# Patient Record
Sex: FEMALE | Race: WHITE | Employment: FULL TIME | URBAN - METROPOLITAN AREA
[De-identification: names, ages, dates, MRNs, and addresses within clinical notes are randomized per-mention and may not be internally consistent; named-entity substitution may affect disease eponyms.]

---

## 2017-06-29 ENCOUNTER — HOSPITAL ENCOUNTER (EMERGENCY)
Facility: HOSPITAL | Age: 57
Discharge: HOME | End: 2017-06-29
Attending: EMERGENCY MEDICINE
Payer: COMMERCIAL

## 2017-06-29 ENCOUNTER — APPOINTMENT (OUTPATIENT)
Dept: GENERAL RADIOLOGY | Facility: HOSPITAL | Age: 57
End: 2017-06-29
Attending: EMERGENCY MEDICINE
Payer: COMMERCIAL

## 2017-06-29 VITALS
SYSTOLIC BLOOD PRESSURE: 139 MMHG | OXYGEN SATURATION: 96 % | RESPIRATION RATE: 16 BRPM | HEIGHT: 63 IN | DIASTOLIC BLOOD PRESSURE: 78 MMHG | WEIGHT: 235 LBS | TEMPERATURE: 98 F | HEART RATE: 78 BPM | BODY MASS INDEX: 41.64 KG/M2

## 2017-06-29 DIAGNOSIS — R07.9 CHEST PAIN OF UNCERTAIN ETIOLOGY: Primary | ICD-10-CM

## 2017-06-29 PROCEDURE — 84484 ASSAY OF TROPONIN QUANT: CPT | Performed by: EMERGENCY MEDICINE

## 2017-06-29 PROCEDURE — 99285 EMERGENCY DEPT VISIT HI MDM: CPT

## 2017-06-29 PROCEDURE — 93010 ELECTROCARDIOGRAM REPORT: CPT

## 2017-06-29 PROCEDURE — 36415 COLL VENOUS BLD VENIPUNCTURE: CPT

## 2017-06-29 PROCEDURE — 71020 XR CHEST PA + LAT CHEST (CPT=71020): CPT | Performed by: EMERGENCY MEDICINE

## 2017-06-29 PROCEDURE — 93005 ELECTROCARDIOGRAM TRACING: CPT

## 2017-06-29 PROCEDURE — 85025 COMPLETE CBC W/AUTO DIFF WBC: CPT | Performed by: EMERGENCY MEDICINE

## 2017-06-29 PROCEDURE — 85378 FIBRIN DEGRADE SEMIQUANT: CPT | Performed by: EMERGENCY MEDICINE

## 2017-06-29 PROCEDURE — 80048 BASIC METABOLIC PNL TOTAL CA: CPT | Performed by: EMERGENCY MEDICINE

## 2017-06-29 RX ORDER — GLYBURIDE 2.5 MG/1
2.5 TABLET ORAL
COMMUNITY
End: 2018-02-09

## 2017-06-29 RX ORDER — ASPIRIN 81 MG/1
324 TABLET, CHEWABLE ORAL ONCE
Status: COMPLETED | OUTPATIENT
Start: 2017-06-29 | End: 2017-06-29

## 2017-06-29 NOTE — ED NOTES
Received call from lab, potassium was hemolyzed. Dr. Gosia Fox notified. Did not receive orders to redraw specimen.

## 2017-06-29 NOTE — ED NOTES
Pt aox4. Rn discussed d/c, & follow up with pcp with pt. Pt verbalized understanding of dc instructions. Pt ambulated to ed exit with steady gait.

## 2017-06-29 NOTE — ED INITIAL ASSESSMENT (HPI)
Pt aox4. Pt c/o left sided chest pain that radiates underneath left axilla started this am while working. Pt states pain increases when moving neck. Pt c/o michelle with chest pain. Pt states has had this pain in the past & has been chronic.  Pt states its been

## 2017-06-29 NOTE — ED PROVIDER NOTES
Patient Seen in: [facility] Emergency Department    History   Patient presents with:  Chest Pain Angina (cardiovascular)    Stated Complaint: chest pain    HPI    59-year-old female, history diabetes, here for chest pain.   She has had sharp chest pa present. Cardiovascular: Normal rate and regular rhythm. Pulmonary/Chest: Effort normal and breath sounds normal. No stridor. Abdominal: Soft. There is no tenderness. There is no guarding. Musculoskeletal: Exhibits no edema or tenderness.    Neurol d-dimer negative. Blood lites within normal limits. Potassium not reported. Given normal creatinine and no reports of palpitations will defer redraw.     Chest x-ray reviewed, normal.  MDM     60-year-old female here with sharp chest pain on her left azalea

## 2018-02-08 ENCOUNTER — APPOINTMENT (OUTPATIENT)
Dept: CT IMAGING | Facility: HOSPITAL | Age: 58
DRG: 069 | End: 2018-02-08
Attending: EMERGENCY MEDICINE
Payer: COMMERCIAL

## 2018-02-08 ENCOUNTER — HOSPITAL ENCOUNTER (OUTPATIENT)
Facility: HOSPITAL | Age: 58
Setting detail: OBSERVATION
Discharge: HOME | DRG: 069 | End: 2018-02-09
Attending: EMERGENCY MEDICINE | Admitting: HOSPITALIST
Payer: COMMERCIAL

## 2018-02-08 ENCOUNTER — APPOINTMENT (OUTPATIENT)
Dept: GENERAL RADIOLOGY | Facility: HOSPITAL | Age: 58
DRG: 069 | End: 2018-02-08
Attending: EMERGENCY MEDICINE
Payer: COMMERCIAL

## 2018-02-08 DIAGNOSIS — G45.9 TRANSIENT CEREBRAL ISCHEMIA, UNSPECIFIED TYPE: Primary | ICD-10-CM

## 2018-02-08 PROBLEM — E11.49 TYPE 2 DIABETES MELLITUS WITH NEUROLOGIC COMPLICATION (HCC): Status: ACTIVE | Noted: 2018-02-08

## 2018-02-08 LAB
ALBUMIN SERPL-MCNC: 3.6 G/DL (ref 3.5–4.8)
ALP LIVER SERPL-CCNC: 101 U/L (ref 46–118)
ALT SERPL-CCNC: 21 U/L (ref 14–54)
APTT PPP: 28.9 SECONDS (ref 25–34)
AST SERPL-CCNC: 20 U/L (ref 15–41)
BASOPHILS # BLD AUTO: 0.04 X10(3) UL (ref 0–0.1)
BASOPHILS NFR BLD AUTO: 0.3 %
BILIRUB SERPL-MCNC: 0.4 MG/DL (ref 0.1–2)
BILIRUB UR QL STRIP.AUTO: NEGATIVE
BUN BLD-MCNC: 18 MG/DL (ref 8–20)
CALCIUM BLD-MCNC: 9.2 MG/DL (ref 8.3–10.3)
CHLORIDE: 108 MMOL/L (ref 101–111)
CHOLEST SMN-MCNC: 132 MG/DL (ref ?–200)
CLARITY UR REFRACT.AUTO: CLEAR
CO2: 26 MMOL/L (ref 22–32)
COLOR UR AUTO: COLORLESS
CREAT BLD-MCNC: 0.64 MG/DL (ref 0.55–1.02)
EOSINOPHIL # BLD AUTO: 0.22 X10(3) UL (ref 0–0.3)
EOSINOPHIL NFR BLD AUTO: 1.9 %
ERYTHROCYTE [DISTWIDTH] IN BLOOD BY AUTOMATED COUNT: 12.8 % (ref 11.5–16)
GLUCOSE BLD-MCNC: 122 MG/DL (ref 65–99)
GLUCOSE BLD-MCNC: 132 MG/DL (ref 65–99)
GLUCOSE BLD-MCNC: 42 MG/DL (ref 70–99)
GLUCOSE BLD-MCNC: 52 MG/DL (ref 65–99)
GLUCOSE BLD-MCNC: 57 MG/DL (ref 65–99)
GLUCOSE BLD-MCNC: 67 MG/DL (ref 65–99)
GLUCOSE BLD-MCNC: 69 MG/DL (ref 65–99)
GLUCOSE BLD-MCNC: 71 MG/DL (ref 65–99)
GLUCOSE BLD-MCNC: 89 MG/DL (ref 65–99)
GLUCOSE UR STRIP.AUTO-MCNC: NEGATIVE MG/DL
HCT VFR BLD AUTO: 37.7 % (ref 34–50)
HDLC SERPL-MCNC: 49 MG/DL (ref 45–?)
HDLC SERPL: 2.69 {RATIO} (ref ?–4.44)
HGB BLD-MCNC: 11.9 G/DL (ref 12–16)
IMMATURE GRANULOCYTE COUNT: 0.04 X10(3) UL (ref 0–1)
IMMATURE GRANULOCYTE RATIO %: 0.3 %
INR BLD: 1 (ref 0.89–1.11)
KETONES UR STRIP.AUTO-MCNC: NEGATIVE MG/DL
LDLC SERPL CALC-MCNC: 65 MG/DL (ref ?–130)
LEUKOCYTE ESTERASE UR QL STRIP.AUTO: NEGATIVE
LYMPHOCYTES # BLD AUTO: 4.29 X10(3) UL (ref 0.9–4)
LYMPHOCYTES NFR BLD AUTO: 36.6 %
M PROTEIN MFR SERPL ELPH: 7.3 G/DL (ref 6.1–8.3)
MCH RBC QN AUTO: 27.5 PG (ref 27–33.2)
MCHC RBC AUTO-ENTMCNC: 31.6 G/DL (ref 31–37)
MCV RBC AUTO: 87.1 FL (ref 81–100)
MONOCYTES # BLD AUTO: 0.72 X10(3) UL (ref 0.1–1)
MONOCYTES NFR BLD AUTO: 6.1 %
NEUTROPHIL ABS PRELIM: 6.41 X10 (3) UL (ref 1.3–6.7)
NEUTROPHILS # BLD AUTO: 6.41 X10(3) UL (ref 1.3–6.7)
NEUTROPHILS NFR BLD AUTO: 54.8 %
NITRITE UR QL STRIP.AUTO: NEGATIVE
NONHDLC SERPL-MCNC: 83 MG/DL (ref ?–130)
PH UR STRIP.AUTO: 6 [PH] (ref 4.5–8)
PLATELET # BLD AUTO: 369 10(3)UL (ref 150–450)
POTASSIUM SERPL-SCNC: 4.4 MMOL/L (ref 3.6–5.1)
PROT UR STRIP.AUTO-MCNC: NEGATIVE MG/DL
PSA SERPL DL<=0.01 NG/ML-MCNC: 13.2 SECONDS (ref 12–14.3)
RBC # BLD AUTO: 4.33 X10(6)UL (ref 3.8–5.1)
RBC UR QL AUTO: NEGATIVE
RED CELL DISTRIBUTION WIDTH-SD: 41.1 FL (ref 35.1–46.3)
SODIUM SERPL-SCNC: 140 MMOL/L (ref 136–144)
SP GR UR STRIP.AUTO: <1.005 (ref 1–1.03)
TRIGL SERPL-MCNC: 92 MG/DL (ref ?–150)
TROPONIN: <0.046 NG/ML (ref ?–0.05)
UROBILINOGEN UR STRIP.AUTO-MCNC: <2 MG/DL
VLDLC SERPL CALC-MCNC: 18 MG/DL (ref 5–40)
WBC # BLD AUTO: 11.7 X10(3) UL (ref 4–13)

## 2018-02-08 PROCEDURE — 71045 X-RAY EXAM CHEST 1 VIEW: CPT | Performed by: EMERGENCY MEDICINE

## 2018-02-08 PROCEDURE — 70450 CT HEAD/BRAIN W/O DYE: CPT | Performed by: EMERGENCY MEDICINE

## 2018-02-08 PROCEDURE — 99222 1ST HOSP IP/OBS MODERATE 55: CPT | Performed by: HOSPITALIST

## 2018-02-08 RX ORDER — PHENYLEPHRINE HCL IN 0.9% NACL 50MG/250ML
PLASTIC BAG, INJECTION (ML) INTRAVENOUS CONTINUOUS PRN
Status: DISCONTINUED | OUTPATIENT
Start: 2018-02-08 | End: 2018-02-09

## 2018-02-08 RX ORDER — POLYETHYLENE GLYCOL 3350 17 G/17G
17 POWDER, FOR SOLUTION ORAL DAILY PRN
Status: DISCONTINUED | OUTPATIENT
Start: 2018-02-08 | End: 2018-02-09

## 2018-02-08 RX ORDER — SODIUM CHLORIDE 9 MG/ML
INJECTION, SOLUTION INTRAVENOUS CONTINUOUS
Status: DISCONTINUED | OUTPATIENT
Start: 2018-02-08 | End: 2018-02-09

## 2018-02-08 RX ORDER — ACETAMINOPHEN 325 MG/1
650 TABLET ORAL EVERY 4 HOURS PRN
Status: DISCONTINUED | OUTPATIENT
Start: 2018-02-08 | End: 2018-02-09

## 2018-02-08 RX ORDER — BISACODYL 10 MG
10 SUPPOSITORY, RECTAL RECTAL
Status: DISCONTINUED | OUTPATIENT
Start: 2018-02-08 | End: 2018-02-09

## 2018-02-08 RX ORDER — SENNOSIDES 8.6 MG
17.2 TABLET ORAL NIGHTLY
Status: DISCONTINUED | OUTPATIENT
Start: 2018-02-08 | End: 2018-02-09

## 2018-02-08 RX ORDER — SODIUM PHOSPHATE, DIBASIC AND SODIUM PHOSPHATE, MONOBASIC 7; 19 G/133ML; G/133ML
1 ENEMA RECTAL ONCE AS NEEDED
Status: DISCONTINUED | OUTPATIENT
Start: 2018-02-08 | End: 2018-02-09

## 2018-02-08 RX ORDER — LABETALOL HYDROCHLORIDE 5 MG/ML
10 INJECTION, SOLUTION INTRAVENOUS EVERY 10 MIN PRN
Status: DISCONTINUED | OUTPATIENT
Start: 2018-02-08 | End: 2018-02-09

## 2018-02-08 RX ORDER — ONDANSETRON 2 MG/ML
4 INJECTION INTRAMUSCULAR; INTRAVENOUS EVERY 6 HOURS PRN
Status: DISCONTINUED | OUTPATIENT
Start: 2018-02-08 | End: 2018-02-09

## 2018-02-08 RX ORDER — MORPHINE SULFATE 2 MG/ML
2 INJECTION, SOLUTION INTRAMUSCULAR; INTRAVENOUS EVERY 2 HOUR PRN
Status: DISCONTINUED | OUTPATIENT
Start: 2018-02-08 | End: 2018-02-09

## 2018-02-08 RX ORDER — GABAPENTIN 100 MG/1
CAPSULE ORAL 2 TIMES DAILY
Status: ON HOLD | COMMUNITY
End: 2018-02-09

## 2018-02-08 RX ORDER — FAMOTIDINE 10 MG/ML
20 INJECTION, SOLUTION INTRAVENOUS 2 TIMES DAILY
Status: DISCONTINUED | OUTPATIENT
Start: 2018-02-08 | End: 2018-02-09

## 2018-02-08 RX ORDER — DOCUSATE SODIUM 100 MG/1
100 CAPSULE, LIQUID FILLED ORAL 2 TIMES DAILY
Status: DISCONTINUED | OUTPATIENT
Start: 2018-02-08 | End: 2018-02-09

## 2018-02-08 RX ORDER — ASPIRIN 81 MG/1
324 TABLET, CHEWABLE ORAL DAILY
Status: DISCONTINUED | OUTPATIENT
Start: 2018-02-09 | End: 2018-02-08 | Stop reason: DRUGHIGH

## 2018-02-08 RX ORDER — ASPIRIN 300 MG
300 SUPPOSITORY, RECTAL RECTAL DAILY
Status: DISCONTINUED | OUTPATIENT
Start: 2018-02-09 | End: 2018-02-09

## 2018-02-08 RX ORDER — ACETAMINOPHEN 650 MG/1
650 SUPPOSITORY RECTAL EVERY 4 HOURS PRN
Status: DISCONTINUED | OUTPATIENT
Start: 2018-02-08 | End: 2018-02-09

## 2018-02-08 RX ORDER — GABAPENTIN 100 MG/1
100 CAPSULE ORAL 3 TIMES DAILY
Status: DISCONTINUED | OUTPATIENT
Start: 2018-02-08 | End: 2018-02-09

## 2018-02-08 RX ORDER — ATORVASTATIN CALCIUM 80 MG/1
80 TABLET, FILM COATED ORAL NIGHTLY
Status: DISCONTINUED | OUTPATIENT
Start: 2018-02-08 | End: 2018-02-09

## 2018-02-08 RX ORDER — FAMOTIDINE 20 MG/1
20 TABLET ORAL 2 TIMES DAILY
Status: DISCONTINUED | OUTPATIENT
Start: 2018-02-08 | End: 2018-02-09

## 2018-02-08 RX ORDER — MORPHINE SULFATE 2 MG/ML
1 INJECTION, SOLUTION INTRAMUSCULAR; INTRAVENOUS EVERY 2 HOUR PRN
Status: DISCONTINUED | OUTPATIENT
Start: 2018-02-08 | End: 2018-02-09

## 2018-02-08 RX ORDER — ENOXAPARIN SODIUM 100 MG/ML
0.5 INJECTION SUBCUTANEOUS NIGHTLY
Status: DISCONTINUED | OUTPATIENT
Start: 2018-02-08 | End: 2018-02-09

## 2018-02-08 RX ORDER — ASPIRIN 325 MG
325 TABLET ORAL DAILY
Status: DISCONTINUED | OUTPATIENT
Start: 2018-02-09 | End: 2018-02-09

## 2018-02-08 RX ORDER — ACETAMINOPHEN 325 MG/1
650 TABLET ORAL EVERY 6 HOURS PRN
Status: DISCONTINUED | OUTPATIENT
Start: 2018-02-08 | End: 2018-02-08 | Stop reason: DRUGHIGH

## 2018-02-08 RX ORDER — DEXTROSE MONOHYDRATE 25 G/50ML
INJECTION, SOLUTION INTRAVENOUS
Status: DISCONTINUED
Start: 2018-02-08 | End: 2018-02-08 | Stop reason: WASHOUT

## 2018-02-08 RX ORDER — ASPIRIN 81 MG/1
324 TABLET, CHEWABLE ORAL ONCE
Status: COMPLETED | OUTPATIENT
Start: 2018-02-08 | End: 2018-02-08

## 2018-02-08 RX ORDER — DEXTROSE MONOHYDRATE 25 G/50ML
50 INJECTION, SOLUTION INTRAVENOUS
Status: DISCONTINUED | OUTPATIENT
Start: 2018-02-08 | End: 2018-02-09

## 2018-02-08 RX ORDER — METOCLOPRAMIDE HYDROCHLORIDE 5 MG/ML
10 INJECTION INTRAMUSCULAR; INTRAVENOUS EVERY 8 HOURS PRN
Status: DISCONTINUED | OUTPATIENT
Start: 2018-02-08 | End: 2018-02-09

## 2018-02-08 NOTE — ED INITIAL ASSESSMENT (HPI)
Pt complaining of an episode of feeling \"woozy\", and lightheaded with left forearm and hand numbness then turned to left finger tingling. Episodes lasted for about 15 minutes.  episode happened at around 12:45 pm. Pt work as a  in [employer] and had be

## 2018-02-08 NOTE — H&P
[facility] HOSPITAL[facility]  History and Physical     Ray Shafer Patient Status:  Emergency    1960 MRN [id number]   Location [address] Attending Sabrina Reilly, [address] [facility] Day # 0 PCP Rita Resendiz DO     Chief Complaint: Yong Ellison Tab Chew by mouth daily. Disp:  Rfl:    METFORMIN HCL OR Take by mouth. Disp:  Rfl:        Review of Systems:   A comprehensive 14 point review of systems was completed. Pertinent positives and negatives noted in the HPI.     Physical Exam:    /75 recheck fasting lipid profile in the morning while on statin  -UA and culture pending  2. Morbid obesity BMI 41 weight loss advised  3.   Possible TIA symptoms completely resolved continue neuro checks and monitoring overnight      Quality:  · DVT Prophyla

## 2018-02-08 NOTE — ED PROVIDER NOTES
Patient Seen in: [facility] Emergency Department    History   Patient presents with:  Dizziness (neurologic)    Stated Complaint: episode of light-headedness, arm tingling, blood sugar 150s    HPI    27-year-old female with history of diabetes mellimelda Pulse 76   Temp 97.9 °F (36.6 °C)   Resp 16   Ht 160 cm (5' 3\")   Wt 106.6 kg   SpO2 100%   BMI 41.63 kg/m²         Physical Exam    GENERAL: Patient is awake, alert, well-appearing, in no acute distress.   HEENT: Pupils equal round reactive to light, extr Absolute 4.29 (*)     All other components within normal limits   TROPONIN I - Normal   PROTHROMBIN TIME (PT) - Normal   PTT, ACTIVATED - Normal    Narrative: The aPTT Heparin Therapeutic Range is approximately 65- 104 seconds.  The therapeutic range ha List        Present on Admission           ICD-10-CM Noted POA    Transient cerebral ischemia G45.9 2/8/2018 Unknown    Type 2 diabetes mellitus with neurologic complication [facility]) C98.70 2/8/2018 Unknown

## 2018-02-09 ENCOUNTER — APPOINTMENT (OUTPATIENT)
Dept: CV DIAGNOSTICS | Facility: HOSPITAL | Age: 58
DRG: 069 | End: 2018-02-09
Attending: Other
Payer: COMMERCIAL

## 2018-02-09 ENCOUNTER — APPOINTMENT (OUTPATIENT)
Dept: ULTRASOUND IMAGING | Facility: HOSPITAL | Age: 58
DRG: 069 | End: 2018-02-09
Attending: Other
Payer: COMMERCIAL

## 2018-02-09 VITALS
HEIGHT: 63 IN | RESPIRATION RATE: 16 BRPM | BODY MASS INDEX: 41.41 KG/M2 | HEART RATE: 68 BPM | WEIGHT: 233.69 LBS | SYSTOLIC BLOOD PRESSURE: 152 MMHG | DIASTOLIC BLOOD PRESSURE: 80 MMHG | TEMPERATURE: 98 F | OXYGEN SATURATION: 96 %

## 2018-02-09 LAB
ATRIAL RATE: 69 BPM
EST. AVERAGE GLUCOSE BLD GHB EST-MCNC: 163 MG/DL (ref 68–126)
EST. AVERAGE GLUCOSE BLD GHB EST-MCNC: 166 MG/DL (ref 68–126)
GLUCOSE BLD-MCNC: 107 MG/DL (ref 65–99)
GLUCOSE BLD-MCNC: 86 MG/DL (ref 65–99)
GLUCOSE BLD-MCNC: 92 MG/DL (ref 65–99)
GLUCOSE BLD-MCNC: 95 MG/DL (ref 65–99)
HBA1C MFR BLD HPLC: 7.3 % (ref ?–5.7)
HBA1C MFR BLD HPLC: 7.4 % (ref ?–5.7)
INR BLD: 1.1 (ref 0.89–1.11)
P AXIS: 65 DEGREES
P-R INTERVAL: 160 MS
PSA SERPL DL<=0.01 NG/ML-MCNC: 14.2 SECONDS (ref 12–14.3)
Q-T INTERVAL: 410 MS
QRS DURATION: 80 MS
QTC CALCULATION (BEZET): 439 MS
R AXIS: 4 DEGREES
T AXIS: 33 DEGREES
VENTRICULAR RATE: 69 BPM

## 2018-02-09 PROCEDURE — 93306 TTE W/DOPPLER COMPLETE: CPT | Performed by: OTHER

## 2018-02-09 PROCEDURE — 99239 HOSP IP/OBS DSCHRG MGMT >30: CPT | Performed by: INTERNAL MEDICINE

## 2018-02-09 PROCEDURE — 93880 EXTRACRANIAL BILAT STUDY: CPT | Performed by: OTHER

## 2018-02-09 PROCEDURE — 99255 IP/OBS CONSLTJ NEW/EST HI 80: CPT | Performed by: OTHER

## 2018-02-09 RX ORDER — GABAPENTIN 100 MG/1
100 CAPSULE ORAL 3 TIMES DAILY
Qty: 60 CAPSULE | Refills: 0 | Status: SHIPPED | OUTPATIENT
Start: 2018-02-09

## 2018-02-09 RX ORDER — ASPIRIN 325 MG
325 TABLET ORAL DAILY
Qty: 30 TABLET | Refills: 1 | Status: SHIPPED | OUTPATIENT
Start: 2018-02-10 | End: 2018-04-07

## 2018-02-09 RX ORDER — FLUTICASONE PROPIONATE 50 MCG
1 SPRAY, SUSPENSION (ML) NASAL DAILY
Status: DISCONTINUED | OUTPATIENT
Start: 2018-02-09 | End: 2018-02-09

## 2018-02-09 RX ORDER — ATORVASTATIN CALCIUM 80 MG/1
80 TABLET, FILM COATED ORAL NIGHTLY
Qty: 30 TABLET | Refills: 1 | Status: SHIPPED | OUTPATIENT
Start: 2018-02-09 | End: 2018-04-07

## 2018-02-09 NOTE — SLP NOTE
Attempted evaluation. Patient on way off unit for testing. Note patient passed RN dysphagia screen and has been tolerating po without difficulty per RN report. Will reattempt as schedule permits.     Chiara Juan Arsenio 87 CCC-SLP  Pager [phone]

## 2018-02-09 NOTE — PAYOR COMM NOTE
--------------  ADMISSION REVIEW     Payor: [address]  Subscriber #:  [id number]  Authorization Number: [id number]    Admit date: 2/8/18  Admit time: 2106       Admitting Physician: Trev Hatfield MD  Attending Physician:  Marty Willett MD  [facility] No date: SKIN TISSUE PROCEDURE UNLISTED    Review of Systems  Positive for stated complaint: episode of light-headedness, arm tingling, blood sugar 150s  Other systems are as noted in HPI. Constitutional and vital signs reviewed.     All other systems revi All other components within normal limits   POCT GLUCOSE - Abnormal; Notable for the following:     POC Glucose 52 (*)     All other components within normal limits   POCT GLUCOSE - Abnormal; Notable for the following:     POC Glucose 57 (*)     All other Author:  Jeannine Hill MD Service:  (none) Author Type:  Physician    Filed:  2/8/2018  8:05 PM Date of Service:  2/8/2018  5:58 PM Status:  Signed    :  Jeannine Hill MD (Physician)         [facility]  History and Physical     Brittny Sebastian METFORMIN HCL OR Take by mouth. Disp:  Rfl:      Review of Systems:   A comprehensive 14 point review of systems was completed. Pertinent positives and negatives noted in the HPI.     Physical Exam:    /75   Pulse 76   Temp 97.9 °F (36.6 °C)   Resp -Continue aspirin 325 mg daily as well as blood sugar control blood pressure control and recheck fasting lipid profile in the morning while on statin  -UA and culture pending  2.   Possible TIA symptoms completely resolved continue neuro checks and monitori Date Action Dose Route User    2/8/2018 2210 New Bag (none) Intravenous Modesta Lopez, ZOHREH        02/08/18 2107  glucose (DEX4) oral liquid 15 g 15 g, Oral, Every 15 min PRN          02/08/18 2107  dextrose 50% injection 50 mL 50 mL, Intravenous, Every 1

## 2018-02-09 NOTE — OCCUPATIONAL THERAPY NOTE
OCCUPATIONAL THERAPY QUICK EVALUATION - INPATIENT    Room Number: 8733/3358-X  Evaluation Date: 2/9/2018     Type of Evaluation: Quick Eval  Presenting Problem: dizziness, L sided arm tingling    Physician Order: IP Consult to Occupational Therapy  Reason functional limits     NEUROLOGICAL FINDINGS        Coordination - Rapid Alternating Movement: Symmetrical  Coordination - Finger Opposition: Symmetrical       ACTIVITIES OF DAILY LIVING ASSESSMENT  AM-PAC ‘6-Clicks’ Inpatient Daily Activity Short Form   Ho Clinical Decision Making  LOW - Analysis of occupational profile, problem-focused assessments, limited treatment options    Overall Complexity  LOW     OT Discharge Recommendations: Home       PLAN   Patient has been evaluated and presents with no skill

## 2018-02-09 NOTE — PROGRESS NOTES
NURSING ADMISSION NOTE      Patient admitted via cart to room 7604 at 2045 from the ED. Oriented to room. Safety precautions initiated. Bed in low position. Call light in reach.

## 2018-02-09 NOTE — CM/SW NOTE
02/09/18 1100   CM/SW Screening   Referral Source Social Work (self-referral)     Patient was screened during rounds and no needs are identified at this time. RN to contact SW/CM if needs arise.

## 2018-02-09 NOTE — ED NOTES
Report given to next shift RN, patient's room assignment changed, waiting for a STAT clean, transport canceled and will be placed once room assignment is cleaned.

## 2018-02-09 NOTE — PLAN OF CARE
Pt A&Ox4, VSS. Required 2L O2 over night. O2 sat would drop in to the mid 80's but did not sustain. Per pt she is supposed to have an outpatient sleep study. Reports tingling to her last two fingers in her left hand, otherwise no neuro deficits.  Denies any

## 2018-02-09 NOTE — PROGRESS NOTES
02/09/18 0834   Clinical Encounter Type   Visited With Patient   Routine Visit Introduction   Continue Visiting No   Patient's Supportive Strategies/Resources ( honored patient by acknowledging her array of resources.)   Patient Spiritual Encoun

## 2018-02-09 NOTE — PLAN OF CARE
Assumed care at 0700. Patient alert and oriented. No neuro changes, continues to have numbness to left fifth finger. Vitals stable, NSR per tele. Up independently. Carotid ultrasound and echo completed. Ok to discharge per all consults.  Discharge instructi

## 2018-02-09 NOTE — PROGRESS NOTES
[facility]ITAL[facility]  Progress Note     Myriam Dancer Patient Status:  Inpatient    1960 MRN [id number]   [facility] 7NE-A Attending Miles Ricardo MD   [facility] Day # 1 PCP Chepe Carrasquillo DO     Chief Complaint: tingling in arm    S: Patient aspirin  300 mg Rectal Daily    Or   • aspirin  325 mg Oral Daily   • Senna  17.2 mg Oral Nightly   • docusate sodium  100 mg Oral BID   • famoTIDine  20 mg Oral BID    Or   • famoTIDine  20 mg Intravenous BID   • enoxaparin  0.5 mg/kg Subcutaneous Nightly

## 2018-02-09 NOTE — PROGRESS NOTES
[facility] Progress Note:  Anticoagulation Weight Dose Adjustment for enoxaparin (Yasmeen File)    Deandre Adair is a 62year old female who has been prescribed enoxaparin (LOVENOX) 40 mg subcutaneously every 24 hours for VTE prophylaxis.       Estimated Creatin

## 2018-02-09 NOTE — PHYSICAL THERAPY NOTE
PHYSICAL THERAPY QUICK EVALUATION - INPATIENT    Room Number: 1720/4578-I  Evaluation Date: 2/9/2018  Presenting Problem: dizziness, L arm paresthesias, r/o CVA  Physician Order: PT Eval and Treat   Pt is 62year old female admitted on 2/8/2018 from work admission    SUBJECTIVE  Pt pleasant and cooperative    OBJECTIVE  Precautions:  Other (Comment) (PN)  Fall Risk: Standard fall risk    WEIGHT BEARING RESTRICTION  Weight Bearing Restriction: None                PAIN ASSESSMENT  Rating: Unable to rate  Loca present    ASSESSMENT   Patient is a 62year old female admitted on 2/8/2018 for LUE paresthesias, r/o CVA. Pertinent comorbidities and personal factors impacting therapy include DM, PN, recent ulnar nerve sx.   Functional outcome measures completed includ

## 2018-02-09 NOTE — DISCHARGE SUMMARY
[facility] [place] [facility]OSPITAL[facility]  DISCHARGE SUMMARY     Jovan Hinds Patient Status:  Inpatient    1960 MRN [id number]   [facility] 7NE-A Attending Christie Britton MD   [facility] Day # 1 PCP Darwyn Dakins, DO     Date of Admission: 2018  Date of Disch was monitored overnight with the TIA protocol and had echo and carotid US done. No worsening of symptoms. Will be dced with aspirin and statin for presumed tia. Outpatient neuro follow up, maybe EMG.     Procedures during hospitalization:   Ct Brain Or Head was normal. Wall thickness was normal.     Systolic function was normal. The estimated ejection fraction was 60-65%.     There was no diagnostic evidence for regional wall motion abnormalities.     Doppler parameters are consistent with abnormal left ventr Tabs  · gabapentin 100 MG Caps         [place]    Follow-up appointment:   Ludmila Khan [address]  [address]  [phone]    Schedule an appointment as soon as possible for a visit in 1 week        Vital signs:

## 2018-02-09 NOTE — CONSULTS
[facility]    Report of Consultation    Nohemy Carlene Patient Status:  Inpatient    1960 MRN [id number]   [facility] 7NE-A Attending Tatianna Carroll MD   [facility] Day # 1 PCP Christopher Khalil DO     Date of Admission:  2018  Date of **OR** glucose (DEX4) oral liquid 30 g, 30 g, Oral, Q15 Min PRN **OR** Glucose-Vitamin C (DEX-4) 4-0.006 g chewable tab 8 tablet, 8 tablet, Oral, Q15 Min PRN  •  0.9%  NaCl infusion, , Intravenous, Continuous  •  Insulin Aspart Pen (NOVOLOG) 100 UNIT/ML fl PRN    Review of Systems:  A 10-point system was reviewed. Pertinent positives and negatives are noted in HPI. Physical Exam:  Blood pressure 147/76, pulse 105, temperature 97.8 °F (36.6 °C), temperature source Oral, resp.  rate 16, height 63\", weigh ulnar transposition surgery  Diabetes    Discussion/recommendations:  Episode of transient nonspecific lightheadedness as well as left hand tingling.  Although these symptoms are nonspecific and differential includes relative dehydration or less likely auto

## 2018-02-14 NOTE — PAYOR COMM NOTE
--------------  DISCHARGE REVIEW    Payor: [facility]  Subscriber #:  [id number]  Authorization Number: [id number]    Admit date: 2/8/18  Admit time:  2106  Discharge Date: 2/9/2018  5:00 PM     Admitting Physician: Bao Vicente MD  Attending P symptoms any focal weakness any blurred vision any slurred speech or swallowing difficulties. She denies chest pain or shortness of breath. Patient takes baby aspirin at home every day and she took it this morning.   [MP. 4]    Brief Synopsis:[MP.1] sanju 2/8/2018  CONCLUSION:  Mild vascular redistribution and bibasilar discoid atelectasis. No pneumonia. Dictated by: Tereso Curtis MD on 2/08/2018 at 16:49     Approved by: Tereso Curtis MD[MP.2]            • ECHO  Conclusions:    1.  Left ventri Chew  Replaced by:  aspirin 325 MG Tabs        glyBURIDE 2.5 MG Tabs  Commonly known as:  Sahara Bowser              Where to Get Your Medications      Please  your prescriptions at the location directed by your doctor or nurse    Bring a paper prescripti

## 2018-03-07 ENCOUNTER — OFFICE VISIT (OUTPATIENT)
Dept: NEUROLOGY | Facility: CLINIC | Age: 58
End: 2018-03-07

## 2018-03-07 VITALS
HEART RATE: 78 BPM | DIASTOLIC BLOOD PRESSURE: 68 MMHG | SYSTOLIC BLOOD PRESSURE: 124 MMHG | BODY MASS INDEX: 40 KG/M2 | WEIGHT: 223 LBS | RESPIRATION RATE: 18 BRPM

## 2018-03-07 DIAGNOSIS — R20.2 PARESTHESIA OF LEFT ARM: ICD-10-CM

## 2018-03-07 DIAGNOSIS — R42 EPISODE OF DIZZINESS: Primary | ICD-10-CM

## 2018-03-07 DIAGNOSIS — G56.22 ULNAR NEUROPATHY OF LEFT UPPER EXTREMITY: ICD-10-CM

## 2018-03-07 PROCEDURE — 99215 OFFICE O/P EST HI 40 MIN: CPT | Performed by: OTHER

## 2018-03-07 PROCEDURE — 1111F DSCHRG MED/CURRENT MED MERGE: CPT | Performed by: OTHER

## 2018-03-07 RX ORDER — ACETAMINOPHEN 500 MG
TABLET ORAL
COMMUNITY

## 2018-03-07 NOTE — PROGRESS NOTES
[facility]   Neurology- INITIAL CLINIC VISIT  3/7/2018, 3:01 PM     Cliff Burr Patient Status:  No patient class for patient encounter    1960 MRN [id number]   Location [address] [place] [address] NIKIA TODD daily., Disp: 60 capsule, Rfl: 0  •  aspirin 325 MG Oral Tab, Take 1 tablet (325 mg total) by mouth daily. , Disp: 30 tablet, Rfl: 1  •  atorvastatin 80 MG Oral Tab, Take 1 tablet (80 mg total) by mouth nightly., Disp: 30 tablet, Rfl: 1  •  SITagliptin Phos Finger-nose-finger intact. Gait was narrow and stable, was able to walk on heels, toes and tandem without any difficulty.      ASSESSMENT/ACTIVE PROBLEM LIST:     Discussion/Plan:  Episode of lightheadedness as well as aggravation of her left ulnar neurop

## 2018-03-07 NOTE — PATIENT INSTRUCTIONS
After your visit at the [facility] office  today,  please direct any follow up questions or medication needs to the staff in our  [place] office so that your concerns may be promptly addressed.   We are available through [employer] or at the numbers below: until this office has notified you that the test has been approved by your insurer. Depending on your insurance carrier, approval may take 3-10 days. It is highly recommended patients contact their insurance carrier directly to determine coverage.   If solo

## 2018-04-09 RX ORDER — ATORVASTATIN CALCIUM 80 MG/1
TABLET, FILM COATED ORAL
Qty: 90 TABLET | Refills: 1 | Status: SHIPPED | OUTPATIENT
Start: 2018-04-09

## 2018-04-09 RX ORDER — ASPIRIN 325 MG
TABLET ORAL
Qty: 90 TABLET | Refills: 1 | Status: SHIPPED | OUTPATIENT
Start: 2018-04-09

## 2018-04-09 NOTE — TELEPHONE ENCOUNTER
Medication: Lipitor 80mg and asa 325mg     Date of last refill: 2/9/18 and 2/10/18  [place]     Last office visit: 3/7/18  Due back to clinic per last office note:  6 months  Date next office visit scheduled:  No appoint

## 2019-03-20 ENCOUNTER — HOSPITAL ENCOUNTER (EMERGENCY)
Facility: HOSPITAL | Age: 59
Discharge: HOME | End: 2019-03-20
Attending: EMERGENCY MEDICINE
Payer: COMMERCIAL

## 2019-03-20 ENCOUNTER — APPOINTMENT (OUTPATIENT)
Dept: CT IMAGING | Facility: HOSPITAL | Age: 59
End: 2019-03-20
Attending: EMERGENCY MEDICINE
Payer: COMMERCIAL

## 2019-03-20 ENCOUNTER — APPOINTMENT (OUTPATIENT)
Dept: GENERAL RADIOLOGY | Facility: HOSPITAL | Age: 59
End: 2019-03-20
Attending: EMERGENCY MEDICINE
Payer: COMMERCIAL

## 2019-03-20 VITALS
WEIGHT: 235 LBS | DIASTOLIC BLOOD PRESSURE: 75 MMHG | OXYGEN SATURATION: 95 % | HEIGHT: 63 IN | TEMPERATURE: 98 F | HEART RATE: 77 BPM | SYSTOLIC BLOOD PRESSURE: 151 MMHG | BODY MASS INDEX: 41.64 KG/M2 | RESPIRATION RATE: 16 BRPM

## 2019-03-20 DIAGNOSIS — E16.2 HYPOGLYCEMIA: Primary | ICD-10-CM

## 2019-03-20 DIAGNOSIS — R20.2 PARESTHESIA: ICD-10-CM

## 2019-03-20 LAB
ALBUMIN SERPL-MCNC: 3.3 G/DL (ref 3.4–5)
ALBUMIN/GLOB SERPL: 0.9 {RATIO} (ref 1–2)
ALP LIVER SERPL-CCNC: 106 U/L (ref 46–118)
ALT SERPL-CCNC: 22 U/L (ref 13–56)
ANION GAP SERPL CALC-SCNC: 9 MMOL/L (ref 0–18)
AST SERPL-CCNC: 17 U/L (ref 15–37)
ATRIAL RATE: 68 BPM
BASOPHILS # BLD AUTO: 0.04 X10(3) UL (ref 0–0.2)
BASOPHILS NFR BLD AUTO: 0.5 %
BILIRUB SERPL-MCNC: 0.2 MG/DL (ref 0.1–2)
BILIRUB UR QL STRIP.AUTO: NEGATIVE
BUN BLD-MCNC: 16 MG/DL (ref 7–18)
BUN/CREAT SERPL: 25.4 (ref 10–20)
CALCIUM BLD-MCNC: 8.6 MG/DL (ref 8.5–10.1)
CHLORIDE SERPL-SCNC: 110 MMOL/L (ref 98–107)
CLARITY UR REFRACT.AUTO: CLEAR
CO2 SERPL-SCNC: 22 MMOL/L (ref 21–32)
COLOR UR AUTO: YELLOW
CREAT BLD-MCNC: 0.63 MG/DL (ref 0.55–1.02)
DEPRECATED RDW RBC AUTO: 43.8 FL (ref 35.1–46.3)
EOSINOPHIL # BLD AUTO: 0.22 X10(3) UL (ref 0–0.7)
EOSINOPHIL NFR BLD AUTO: 2.6 %
ERYTHROCYTE [DISTWIDTH] IN BLOOD BY AUTOMATED COUNT: 13.6 % (ref 11–15)
GLOBULIN PLAS-MCNC: 3.6 G/DL (ref 2.8–4.4)
GLUCOSE BLD-MCNC: 112 MG/DL (ref 70–99)
GLUCOSE BLD-MCNC: 54 MG/DL (ref 70–99)
GLUCOSE UR STRIP.AUTO-MCNC: NEGATIVE MG/DL
HCT VFR BLD AUTO: 36.6 % (ref 35–48)
HGB BLD-MCNC: 11.6 G/DL (ref 12–16)
IMM GRANULOCYTES # BLD AUTO: 0.03 X10(3) UL (ref 0–1)
IMM GRANULOCYTES NFR BLD: 0.4 %
KETONES UR STRIP.AUTO-MCNC: NEGATIVE MG/DL
LYMPHOCYTES # BLD AUTO: 2.45 X10(3) UL (ref 1–4)
LYMPHOCYTES NFR BLD AUTO: 28.6 %
M PROTEIN MFR SERPL ELPH: 6.9 G/DL (ref 6.4–8.2)
MCH RBC QN AUTO: 28 PG (ref 26–34)
MCHC RBC AUTO-ENTMCNC: 31.7 G/DL (ref 31–37)
MCV RBC AUTO: 88.2 FL (ref 80–100)
MONOCYTES # BLD AUTO: 0.43 X10(3) UL (ref 0.1–1)
MONOCYTES NFR BLD AUTO: 5 %
NEUTROPHILS # BLD AUTO: 5.4 X10 (3) UL (ref 1.5–7.7)
NEUTROPHILS # BLD AUTO: 5.4 X10(3) UL (ref 1.5–7.7)
NEUTROPHILS NFR BLD AUTO: 62.9 %
NITRITE UR QL STRIP.AUTO: NEGATIVE
OSMOLALITY SERPL CALC.SUM OF ELEC: 291 MOSM/KG (ref 275–295)
P AXIS: 65 DEGREES
P-R INTERVAL: 164 MS
PH UR STRIP.AUTO: 5 [PH] (ref 4.5–8)
PLATELET # BLD AUTO: 283 10(3)UL (ref 150–450)
POTASSIUM SERPL-SCNC: 4.5 MMOL/L (ref 3.5–5.1)
PROT UR STRIP.AUTO-MCNC: NEGATIVE MG/DL
Q-T INTERVAL: 406 MS
QRS DURATION: 70 MS
QTC CALCULATION (BEZET): 431 MS
R AXIS: 10 DEGREES
RBC # BLD AUTO: 4.15 X10(6)UL (ref 3.8–5.3)
RBC UR QL AUTO: NEGATIVE
SODIUM SERPL-SCNC: 141 MMOL/L (ref 136–145)
SP GR UR STRIP.AUTO: 1.02 (ref 1–1.03)
T AXIS: 41 DEGREES
TROPONIN I SERPL-MCNC: <0.045 NG/ML (ref ?–0.04)
UROBILINOGEN UR STRIP.AUTO-MCNC: <2 MG/DL
VENTRICULAR RATE: 68 BPM
WBC # BLD AUTO: 8.6 X10(3) UL (ref 4–11)

## 2019-03-20 PROCEDURE — 96361 HYDRATE IV INFUSION ADD-ON: CPT

## 2019-03-20 PROCEDURE — 93010 ELECTROCARDIOGRAM REPORT: CPT

## 2019-03-20 PROCEDURE — 87086 URINE CULTURE/COLONY COUNT: CPT | Performed by: EMERGENCY MEDICINE

## 2019-03-20 PROCEDURE — 99285 EMERGENCY DEPT VISIT HI MDM: CPT

## 2019-03-20 PROCEDURE — 70450 CT HEAD/BRAIN W/O DYE: CPT | Performed by: EMERGENCY MEDICINE

## 2019-03-20 PROCEDURE — 71045 X-RAY EXAM CHEST 1 VIEW: CPT | Performed by: EMERGENCY MEDICINE

## 2019-03-20 PROCEDURE — 93005 ELECTROCARDIOGRAM TRACING: CPT

## 2019-03-20 PROCEDURE — 82962 GLUCOSE BLOOD TEST: CPT

## 2019-03-20 PROCEDURE — 81001 URINALYSIS AUTO W/SCOPE: CPT | Performed by: EMERGENCY MEDICINE

## 2019-03-20 PROCEDURE — 84484 ASSAY OF TROPONIN QUANT: CPT | Performed by: EMERGENCY MEDICINE

## 2019-03-20 PROCEDURE — 96374 THER/PROPH/DIAG INJ IV PUSH: CPT

## 2019-03-20 PROCEDURE — 85025 COMPLETE CBC W/AUTO DIFF WBC: CPT | Performed by: EMERGENCY MEDICINE

## 2019-03-20 PROCEDURE — 80053 COMPREHEN METABOLIC PANEL: CPT | Performed by: EMERGENCY MEDICINE

## 2019-03-20 RX ORDER — DEXTROSE MONOHYDRATE 25 G/50ML
25 INJECTION, SOLUTION INTRAVENOUS ONCE
Status: COMPLETED | OUTPATIENT
Start: 2019-03-20 | End: 2019-03-20

## 2019-03-20 RX ORDER — SODIUM CHLORIDE 9 MG/ML
INJECTION, SOLUTION INTRAVENOUS CONTINUOUS
Status: DISCONTINUED | OUTPATIENT
Start: 2019-03-20 | End: 2019-03-20

## 2019-03-20 NOTE — ED PROVIDER NOTES
Patient Seen in: [facility] Emergency Department    History   Patient presents with:  Numbness Weakness (neurologic)    Stated Complaint: arm weakness/numbness     HPI    There is a pleasant 63-year-old female presenting with tingling.   She is havin and palatine tonsils show no purulent material or erythema.   No submandibular erythema and no tenderness along the sternocleidomastoid and no nuchal rigidity  Lungs are clear to auscultation bilaterally with no wheezes retractions or rhonchi noted  Cardiov RAINBOW DRAW GOLD   RAINBOW DRAW BLUE   URINE CULTURE, ROUTINE     EKG    Rate, intervals and axes as noted on EKG Report.   Rate: 68  Rhythm: Sinus Rhythm  Reading: No areas of acute ST segment elevation or depression                    MDM   Patient see

## 2019-03-20 NOTE — ED INITIAL ASSESSMENT (HPI)
Pt aox4. Pt presents to ed from work. Pt ambulated to room A8 with steady gait. Pt states \"I feel like I ran a marathon. \" Pt c/o left armn numbness/tingling and fatigue that started yesterday. Pt c/o blaise leg weakness started this am with michelle.  Pt denies c

## 2019-03-20 NOTE — ED NOTES
Pt awake and alert, skin w/d,resps reg/unlabored. Pt states she is feeling a little better. Awaiting MD dispo. NS infusing, approximately 300 cc's infused at this time.

## 2019-03-20 NOTE — ED NOTES
Pt awake and alert, appears comfortable. Pt states she ate this morning but normally has a snack by now. Per MD, ok to give juice and crackers.

## 2019-05-01 ENCOUNTER — OFFICE VISIT (OUTPATIENT)
Dept: NEUROLOGY | Facility: CLINIC | Age: 59
End: 2019-05-01
Payer: COMMERCIAL

## 2019-05-01 VITALS
RESPIRATION RATE: 16 BRPM | DIASTOLIC BLOOD PRESSURE: 74 MMHG | WEIGHT: 229 LBS | SYSTOLIC BLOOD PRESSURE: 128 MMHG | HEART RATE: 78 BPM | BODY MASS INDEX: 41 KG/M2

## 2019-05-01 DIAGNOSIS — Z72.820 POOR SLEEP: ICD-10-CM

## 2019-05-01 DIAGNOSIS — R47.89 WORD FINDING DIFFICULTY: ICD-10-CM

## 2019-05-01 DIAGNOSIS — G56.22 ULNAR NEUROPATHY OF LEFT UPPER EXTREMITY: Primary | ICD-10-CM

## 2019-05-01 DIAGNOSIS — R25.2 CRAMP OF BOTH LOWER EXTREMITIES: ICD-10-CM

## 2019-05-01 PROCEDURE — 99214 OFFICE O/P EST MOD 30 MIN: CPT | Performed by: OTHER

## 2019-05-01 RX ORDER — GLYBURIDE 2.5 MG/1
TABLET ORAL DAILY
Refills: 1 | COMMUNITY
Start: 2019-04-06

## 2019-05-01 NOTE — PROGRESS NOTES
Patient here to follow up regarding recent episode of left upper extremity tingling and trouble with speech. Was in ER recently for this episode. Does have symptoms on her left UE, feels hot, swollen and achy at times.  Also still having occasional trouble

## 2019-05-01 NOTE — PATIENT INSTRUCTIONS
After your visit at the [facility] office  today,  please direct any follow up questions or medication needs to the staff in our  [place] office so that your concerns may be promptly addressed.   We are available through [employer] or at the numbers below: must be picked up in office. • Please allow the office 2-3 business days to fill the prescription. • Patient must present photo ID at time of .   PLEASE NOTE: PRESCRIPTIONS MUST BE PICKED UP PRIOR TO 3:00PM MONDAY-THURSDAY AND PRIOR TO 1:00PM ON F completed by the patient prior to submitting forms to office staff. • Form completion may require an additional fee. • A signed Release of Information (OMAR) must be on file before forms may be submitted.   When dropping off forms, please ask the recepti

## 2019-05-01 NOTE — PROGRESS NOTES
[facility]   Neurology- follow up    Manuelito Keen Patient Status:  No patient class for patient encounter    1960 MRN [id number]   Location [address], [place] PCP Deleon Mom,        Reason never used smokeless tobacco. She reports that she does not drink alcohol or use drugs.     Allergies:    Tomatoes                TONGUE SWELLING  Perfumes                NAUSEA ONLY    Comment:Any strong odors    MEDICATIONS:    Current Outpatient Kenneth Fuller fasciculations. Manual muscle testing revealed MRC grade 5/5 strength throughout including proximal and distal muscles of the arms and legs. Deep tendon reflexes were 2 at the biceps, brachioradialis, triceps, knee jerk, and ankle jerk.  Plantar responses patient.       Alec Marmolejo DO  Neurology and Neuromuscular medicine  [facility]

## 2019-05-08 ENCOUNTER — APPOINTMENT (OUTPATIENT)
Dept: LAB | Age: 59
End: 2019-05-08
Attending: Other
Payer: COMMERCIAL

## 2019-05-08 DIAGNOSIS — R47.89 WORD FINDING DIFFICULTY: ICD-10-CM

## 2019-05-08 PROCEDURE — 84443 ASSAY THYROID STIM HORMONE: CPT | Performed by: OTHER

## 2019-05-08 PROCEDURE — 82607 VITAMIN B-12: CPT | Performed by: OTHER

## 2019-05-08 PROCEDURE — 36415 COLL VENOUS BLD VENIPUNCTURE: CPT | Performed by: OTHER

## 2019-05-13 ENCOUNTER — TELEPHONE (OUTPATIENT)
Dept: NEUROLOGY | Facility: CLINIC | Age: 59
End: 2019-05-13

## 2019-05-13 NOTE — TELEPHONE ENCOUNTER
B12 in the lower range, checked for memory. Starting 1000mcg daily of oral Vitamin B12 is recommended, (methylcobalamin over cyancobalamin is recommended). Left message on voicemail.

## 2020-09-11 ENCOUNTER — APPOINTMENT (OUTPATIENT)
Dept: GENERAL RADIOLOGY | Facility: HOSPITAL | Age: 60
End: 2020-09-11
Attending: EMERGENCY MEDICINE
Payer: COMMERCIAL

## 2020-09-11 ENCOUNTER — HOSPITAL ENCOUNTER (EMERGENCY)
Facility: HOSPITAL | Age: 60
Discharge: HOME | End: 2020-09-11
Attending: EMERGENCY MEDICINE
Payer: COMMERCIAL

## 2020-09-11 VITALS
DIASTOLIC BLOOD PRESSURE: 87 MMHG | TEMPERATURE: 99 F | HEIGHT: 63 IN | OXYGEN SATURATION: 95 % | SYSTOLIC BLOOD PRESSURE: 150 MMHG | HEART RATE: 72 BPM | RESPIRATION RATE: 16 BRPM | WEIGHT: 225 LBS | BODY MASS INDEX: 39.87 KG/M2

## 2020-09-11 DIAGNOSIS — R05.9 COUGH: Primary | ICD-10-CM

## 2020-09-11 PROCEDURE — 71045 X-RAY EXAM CHEST 1 VIEW: CPT | Performed by: EMERGENCY MEDICINE

## 2020-09-11 PROCEDURE — 99283 EMERGENCY DEPT VISIT LOW MDM: CPT

## 2020-09-11 RX ORDER — AMLODIPINE BESYLATE 2.5 MG/1
2.5 TABLET ORAL DAILY
Qty: 14 TABLET | Refills: 0 | Status: SHIPPED | OUTPATIENT
Start: 2020-09-11 | End: 2020-09-25

## 2020-09-12 NOTE — ED PROVIDER NOTES
Patient Seen in: [facility] Emergency Department      History   Patient presents with:  Cough/URI    Stated Complaint: cough x 2 months. neg COVID. completed zpack without improvement.       HPI    This is a 45-year-old woman here with complaint of d nursing note reviewed. General: Middle-age woman in no acute distress  Head: Normocephalic and atraumatic. HEENT:  Mucous membranes are moist.  Oropharynx clear  Cardiovascular:  Normal rate and regular rhythm.   No Edema  Pulmonary:  Pulmonary effort i lisinopril, has been taking this for quite some time. Believe this may be the source of her cough, we will switch her to amlodipine 2.5 mg daily, advised that she follow-up closely with her primary doctor, also check her blood pressure at home.   Return pr

## 2021-04-06 ENCOUNTER — APPOINTMENT (OUTPATIENT)
Dept: GENERAL RADIOLOGY | Facility: HOSPITAL | Age: 61
End: 2021-04-06
Attending: EMERGENCY MEDICINE
Payer: COMMERCIAL

## 2021-04-06 ENCOUNTER — HOSPITAL ENCOUNTER (EMERGENCY)
Facility: HOSPITAL | Age: 61
Discharge: HOME | End: 2021-04-06
Attending: EMERGENCY MEDICINE
Payer: COMMERCIAL

## 2021-04-06 VITALS
HEIGHT: 63 IN | RESPIRATION RATE: 18 BRPM | TEMPERATURE: 98 F | OXYGEN SATURATION: 95 % | DIASTOLIC BLOOD PRESSURE: 65 MMHG | HEART RATE: 80 BPM | WEIGHT: 230 LBS | SYSTOLIC BLOOD PRESSURE: 153 MMHG | BODY MASS INDEX: 40.75 KG/M2

## 2021-04-06 DIAGNOSIS — R07.89 CHEST WALL PAIN: Primary | ICD-10-CM

## 2021-04-06 PROCEDURE — 93010 ELECTROCARDIOGRAM REPORT: CPT

## 2021-04-06 PROCEDURE — 71045 X-RAY EXAM CHEST 1 VIEW: CPT | Performed by: EMERGENCY MEDICINE

## 2021-04-06 PROCEDURE — 96374 THER/PROPH/DIAG INJ IV PUSH: CPT

## 2021-04-06 PROCEDURE — 93005 ELECTROCARDIOGRAM TRACING: CPT

## 2021-04-06 PROCEDURE — 80053 COMPREHEN METABOLIC PANEL: CPT | Performed by: EMERGENCY MEDICINE

## 2021-04-06 PROCEDURE — 99284 EMERGENCY DEPT VISIT MOD MDM: CPT

## 2021-04-06 PROCEDURE — 85379 FIBRIN DEGRADATION QUANT: CPT | Performed by: EMERGENCY MEDICINE

## 2021-04-06 PROCEDURE — 85025 COMPLETE CBC W/AUTO DIFF WBC: CPT | Performed by: EMERGENCY MEDICINE

## 2021-04-06 PROCEDURE — 84484 ASSAY OF TROPONIN QUANT: CPT | Performed by: EMERGENCY MEDICINE

## 2021-04-06 RX ORDER — KETOROLAC TROMETHAMINE 30 MG/ML
15 INJECTION, SOLUTION INTRAMUSCULAR; INTRAVENOUS ONCE
Status: COMPLETED | OUTPATIENT
Start: 2021-04-06 | End: 2021-04-06

## 2021-04-06 NOTE — ED QUICK NOTES
Pt c/o rt side pain / rt rib pain after coughing, pt denies pain presently, pt pmhx dm, pt aox4, nad , ambulates steady and unassisted

## 2021-04-06 NOTE — ED INITIAL ASSESSMENT (HPI)
Patient reports she was going to clear her throat, coughed, and developed pain around her right rib area.   Not feeling well since the weekend + aching

## 2021-04-06 NOTE — ED PROVIDER NOTES
Patient Seen in: [facility] Emergency Department      History   Patient presents with:  Pain    Stated Complaint: rib pain on inspiration    HPI/Subjective:   HPI    This is a 80-year-old female who states that she was trying to clear her c throat a reviewed and negative except as noted above.     Physical Exam     ED Triage Vitals [04/06/21 1427]   /65   Pulse 92   Resp 18   Temp 98 °F (36.7 °C)   Temp src Oral   SpO2 95 %   O2 Device None (Room air)       Current:/65   Pulse 80   Temp 98 WITH PLATELET.   Procedure                               Abnormality         Status                     ---------                               -----------         ------                     CBC W/ DIFFERENTIAL[[id number]]          Abnormal            Final shows normal sinus rhythm. There is no acute ST elevations or ischemic findings. The rest of the EKG including rate rhythm axis and intervals I agree with the EKG report .  The rate is 74      The patient symptoms really started just with a sneeze she has

## 2022-09-19 ENCOUNTER — HOSPITAL ENCOUNTER (EMERGENCY)
Facility: HOSPITAL | Age: 62
Discharge: HOME | End: 2022-09-19
Attending: EMERGENCY MEDICINE

## 2022-09-19 VITALS
RESPIRATION RATE: 16 BRPM | SYSTOLIC BLOOD PRESSURE: 156 MMHG | TEMPERATURE: 97 F | BODY MASS INDEX: 41.77 KG/M2 | OXYGEN SATURATION: 96 % | WEIGHT: 227 LBS | DIASTOLIC BLOOD PRESSURE: 72 MMHG | HEIGHT: 62 IN | HEART RATE: 70 BPM

## 2022-09-19 DIAGNOSIS — M54.50 ACUTE LOW BACK PAIN, UNSPECIFIED BACK PAIN LATERALITY, UNSPECIFIED WHETHER SCIATICA PRESENT: Primary | ICD-10-CM

## 2022-09-19 PROCEDURE — 99284 EMERGENCY DEPT VISIT MOD MDM: CPT

## 2022-09-19 PROCEDURE — 96375 TX/PRO/DX INJ NEW DRUG ADDON: CPT

## 2022-09-19 PROCEDURE — 96374 THER/PROPH/DIAG INJ IV PUSH: CPT

## 2022-09-19 RX ORDER — CYCLOBENZAPRINE HCL 10 MG
10 TABLET ORAL 3 TIMES DAILY PRN
Qty: 20 TABLET | Refills: 0 | Status: SHIPPED | OUTPATIENT
Start: 2022-09-19 | End: 2022-09-26

## 2022-09-19 RX ORDER — DIAZEPAM 5 MG/ML
5 INJECTION, SOLUTION INTRAMUSCULAR; INTRAVENOUS ONCE
Status: COMPLETED | OUTPATIENT
Start: 2022-09-19 | End: 2022-09-19

## 2022-09-19 RX ORDER — KETOROLAC TROMETHAMINE 30 MG/ML
30 INJECTION, SOLUTION INTRAMUSCULAR; INTRAVENOUS ONCE
Status: COMPLETED | OUTPATIENT
Start: 2022-09-19 | End: 2022-09-19

## 2022-09-19 RX ORDER — MELOXICAM 10 MG/1
CAPSULE ORAL
COMMUNITY

## 2022-09-19 RX ORDER — CHOLECALCIFEROL (VITAMIN D3) 125 MCG
500 CAPSULE ORAL DAILY
COMMUNITY

## 2022-09-19 NOTE — ED INITIAL ASSESSMENT (HPI)
Severe low back pain. Was seen 1 week ago at [facility]. Returned to work today and back locked up again. Arrived by EMS was given Fentanyl IV with some relief.

## (undated) NOTE — ED AVS SNAPSHOT
[facility] Emergency Department  [facility]  [address] [address]  Phone:  [phone]  Fax:  [address] [facility]   MRN: [id number]    Department:  [facility] Emergency Department   Date of Visit:  6/29/2017 IF THERE IS ANY CHANGE OR WORSENING OF YOUR CONDITION, CALL YOUR PRIMARY CARE PHYSICIAN AT ONCE OR RETURN IMMEDIATELY TO THE EMERGENCY DEPARTMENT.     If you have been prescribed any medication(s), please fill your prescription right away and begin taking t

## (undated) NOTE — LETTER
Date & Time: 9/19/2022, 7:52 PM  Patient: Brianna Hightower  Encounter Provider(s):    Cameron Turner MD       To Whom It May Concern:    Sidney Mccray was seen and treated in our department on 9/19/2022.  She  may return to work on 9/22/2022    If you have any questions or concerns, please do not hesitate to call.        _____________________________  Physician/APC Signature

## (undated) NOTE — ED AVS SNAPSHOT
Verona Aguero   MRN: [id number]    Department:  [facility] Emergency Department   Date of Visit:  3/20/2019           Disclosure     Insurance plans vary and the physician(s) referred by the ER may not be covered by your plan.  Please contact your tell this physician (or your personal doctor if your instructions are to return to your personal doctor) about any new or lasting problems. The primary care or specialist physician will see patients referred from the [facility] Emergency Department.  Aman Moura